# Patient Record
Sex: FEMALE | Race: WHITE | NOT HISPANIC OR LATINO | Employment: FULL TIME | URBAN - METROPOLITAN AREA
[De-identification: names, ages, dates, MRNs, and addresses within clinical notes are randomized per-mention and may not be internally consistent; named-entity substitution may affect disease eponyms.]

---

## 2018-05-06 ENCOUNTER — OFFICE VISIT (OUTPATIENT)
Dept: URGENT CARE | Facility: MEDICAL CENTER | Age: 28
End: 2018-05-06
Payer: COMMERCIAL

## 2018-05-06 VITALS
SYSTOLIC BLOOD PRESSURE: 142 MMHG | OXYGEN SATURATION: 99 % | DIASTOLIC BLOOD PRESSURE: 103 MMHG | TEMPERATURE: 98.3 F | WEIGHT: 236 LBS | RESPIRATION RATE: 20 BRPM | HEART RATE: 81 BPM

## 2018-05-06 DIAGNOSIS — J06.9 UPPER RESPIRATORY TRACT INFECTION, UNSPECIFIED TYPE: Primary | ICD-10-CM

## 2018-05-06 DIAGNOSIS — J45.41 MODERATE PERSISTENT ASTHMA WITH ACUTE EXACERBATION: ICD-10-CM

## 2018-05-06 PROCEDURE — G0382 LEV 3 HOSP TYPE B ED VISIT: HCPCS | Performed by: PHYSICIAN ASSISTANT

## 2018-05-06 RX ORDER — PREDNISONE 10 MG/1
10 TABLET ORAL 2 TIMES DAILY WITH MEALS
Qty: 30 TABLET | Refills: 0 | Status: SHIPPED | OUTPATIENT
Start: 2018-05-06 | End: 2018-05-11

## 2018-05-06 NOTE — PROGRESS NOTES
[facility]        NAME: Gavin Guaman is a 28 y o  female  : 1990    MRN: [id number]  DATE: May 6, 2018  TIME: 5:20 PM    Assessment and Plan   Upper respiratory tract infection, unspecified type [J06 9]  1  Upper respiratory tract infection, unspecified type     2  Moderate persistent asthma with acute exacerbation  predniSONE 10 mg tablet         Patient Instructions       Follow up with PCP in 3-5 days  Proceed to  ER if symptoms worsen  Chief Complaint     Chief Complaint   Patient presents with    Cough     with chest congestion since yesterday          History of Present Illness       The patient is a 71-year-old female presents with a 3 day history of increasing cough, chest tightness and audible wheezing  Her symptoms started initially with runny nose and nasal congestion, the cough, shortness of breath and wheezing have increased over the past 24 hours  The patient has been using her albuterol inhaler every 4 hours with little relief in her symptoms  Review of Systems   Review of Systems   Constitutional: Negative  HENT: Positive for congestion, postnasal drip and rhinorrhea  Respiratory: Positive for cough and wheezing  Cardiovascular: Negative  Gastrointestinal: Negative            Current Medications       Current Outpatient Prescriptions:     predniSONE 10 mg tablet, Take 1 tablet (10 mg total) by mouth 2 (two) times a day with meals for 5 days 3 PO BID x 5 days, Disp: 30 tablet, Rfl: 0    Current Allergies     Allergies as of 2018 - never reviewed   Allergen Reaction Noted    Morphine Vomiting 2018            The following portions of the patient's history were reviewed and updated as appropriate: allergies, current medications, past family history, past medical history, past social history, past surgical history and problem list      Past Medical History:   Diagnosis Date    Asthma        Past Surgical History:   Procedure Laterality Date  NO PAST SURGERIES         No family history on file  Medications have been verified  Objective   BP (!) 142/103   Pulse 81   Temp 98 3 °F (36 8 °C) (Temporal)   Resp 20   Wt 107 kg (236 lb)   SpO2 99%        Physical Exam     Physical Exam   Constitutional: She appears well-developed and well-nourished  No distress  HENT:   Head: Normocephalic and atraumatic  Right Ear: Tympanic membrane normal    Left Ear: Tympanic membrane normal    Nose: Mucosal edema and rhinorrhea present  Mouth/Throat: Uvula is midline, oropharynx is clear and moist and mucous membranes are normal    Cardiovascular: Normal rate, regular rhythm and normal heart sounds  No murmur heard  Pulmonary/Chest: Effort normal  No respiratory distress  She has wheezes

## 2018-05-06 NOTE — PATIENT INSTRUCTIONS
1  Take Prednisone 10mg  Tablets  3 tablets twice daily x 5 days  2  Continue Albuterol HFA 2 puffs every 4-6 hours until cough free  3  Recommend follow-up with PCP in 2-3 days  4   ER if symptoms increase